# Patient Record
Sex: MALE | Race: BLACK OR AFRICAN AMERICAN | Employment: FULL TIME | URBAN - METROPOLITAN AREA
[De-identification: names, ages, dates, MRNs, and addresses within clinical notes are randomized per-mention and may not be internally consistent; named-entity substitution may affect disease eponyms.]

---

## 2017-02-15 ENCOUNTER — HOSPITAL ENCOUNTER (OUTPATIENT)
Dept: PHYSICAL THERAPY | Age: 52
Discharge: HOME | End: 2017-02-15
Payer: COMMERCIAL

## 2017-02-15 PROCEDURE — 97110 THERAPEUTIC EXERCISES: CPT

## 2017-02-15 PROCEDURE — 97162 PT EVAL MOD COMPLEX 30 MIN: CPT

## 2017-02-15 PROCEDURE — 97530 THERAPEUTIC ACTIVITIES: CPT
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
## 2017-02-21 ENCOUNTER — HOSPITAL ENCOUNTER (OUTPATIENT)
Dept: PHYSICAL THERAPY | Age: 52
Discharge: HOME | End: 2017-02-21
Payer: COMMERCIAL

## 2017-02-21 PROCEDURE — 97113 AQUATIC THERAPY/EXERCISES: CPT

## 2017-02-21 NOTE — PROGRESS NOTES
PHYSICAL THERAPY - DAILY TREATMENT NOTE - AQUATICS    Patient Name: Bridger Catching        Date: 2017  : 1965   YES Patient  Verified  Visit #:   2     Insurance: Payor: Unknown Ades / Plan: Orlando Gilbert PPO / Product Type: PPO /      In time: 2:00 Out time: 2;48   Total Treatment Time: 48     Medicare Time Tracking (below)   Total Timed Codes (min):  na 1:1 Treatment Time:  na     TREATMENT AREA =  C/S, L/S, B shd, B knees     SUBJECTIVE    Pain Level (on 0 to 10 scale):  5  / 10   Medication Changes/New allergies or changes in medical history, any new surgeries or procedures? NO    If yes, update Summary List   Subjective Functional Status/Changes:  []  No changes reported     Pt c/o pain in his neck and lower back today. OBJECTIVE  48  (24) min Therapeutic Exercise:   [x]  Aquatic Therapy   Rationale:      increase strength, increase ROM, and improve balance to improve the patients ability to perform ADL's and ambulate with less pain and increase activity tolerance.       [x]   Patient Education:  Continue Aquatic Therapy and HEP as instructed     UE exercise resistance:                                                  LE exercises:                                                                 [] none                                                                             []  thera-band resistance       [] small water weights                                                   [] no UE support       [x] medium water weights                                              [x]  1 UE support        [] large water weights                                                   [x]  2 UE support          [] back supported on wall       [] thera-band      SLS UE support:                      [] both UE                          [] 1 UE       [] 1 finger/fingers       []  none       [] EC     Post Treatment Pain Level (on 0 to 10) scale:   5  / 10     Other  Introduction to aquatic therapy program.    Pt completed 10-20 reps of each exercise depending on difficulty/tolerance. Pt required cueing to keep B shoulders in water and maintain neutral posture  during UE ex for resistance. ASSESSMENT    Assessment/Changes in Function:     Pt with good tolerance to initiation of aquatic ex. []  See Progress Note/Recertification   Patient will continue to benefit from skilled PT services to modify and progress therapeutic interventions, address functional mobility deficits, address strength deficits, analyze and address soft tissue restrictions, analyze and cue movement patterns, assess and modify postural abnormalities and instruct in home and community integration to attain remaining goals. Progress toward goals / Updated goals:    1) Patient to be independent with aquatherapy to transition to aquatic HEP for self management/prevention after DC. Initiated aquatics this session  2) Patient to increase L cervical AROM rotation to > 70 degrees to facilitate safety in driving. 3) Patient to increase FS FOTO to > 68 to indicate increased functional independence. 4) Patient to be Independent with HEP to self-manage/prevent symptoms after DC.      PLAN    []  Upgrade activities as tolerated YES Continue plan of care   []  Discharge due to :    []  Other:      Therapist: Deana Cheng PTA    Date: 2/21/2017 Time: 6:53 PM     Future Appointments  Date Time Provider Quinton Monroy   2/23/2017 11:00 AM Ning Marsh [facility]   2/24/2017 10:00 AM [address] [place] [address]   2/27/2017 2:00 PM Norma Arredondo PT [facility]   3/1/2017 3:30 PM Ning Marsh PTA [facility]   3/2/2017 2:00 PM Norma Arredondo PT [facility]   3/7/2017 4:30 PM [address] [place] [address]   3/8/2017 3:30 PM [facility]   3/10/2017 11:00 AM [address] [place] [address]   3/14/2017 4:30 PM [address] [place] [address]   3/15/2017 3:30 PM [facility]   3/17/2017 11:00  [facility]

## 2017-02-23 ENCOUNTER — HOSPITAL ENCOUNTER (OUTPATIENT)
Dept: PHYSICAL THERAPY | Age: 52
Discharge: HOME | End: 2017-02-23
Payer: COMMERCIAL

## 2017-02-23 PROCEDURE — 97530 THERAPEUTIC ACTIVITIES: CPT

## 2017-02-23 PROCEDURE — 97110 THERAPEUTIC EXERCISES: CPT

## 2017-02-23 NOTE — PROGRESS NOTES
PHYSICAL THERAPY - DAILY TREATMENT NOTE    Patient Name: Chad Burrell        Date: 2017  : 1965   yes Patient  Verified  Visit #:   3   of   12  Insurance: Payor: Sandee Monae / Plan: [address] [place] [address] [place] PPO / Product Type: PPO /      In time: 1100 Out time: 1155   Total Treatment Time: 55     TREATMENT AREA =  Neck pain [M54.2]  Bilateral shoulder pain [M25.511, M25.512]  Low back pain [M54.5]  Bilateral knee pain [M25.561, M25.562]    SUBJECTIVE  Pain Level (on 0 to 10 scale):  4  / 10   Medication Changes/New allergies or changes in medical history, any new surgeries or procedures?    no  If yes, update Summary List   Subjective Functional Status/Changes:  []  No changes reported     \"I am sore and feeling it in the neck, back today\"          OBJECTIVE  Modalities Rationale:     decrease inflammation, decrease pain and increase tissue extensibility to improve patient's ability to perform ADLs/ bending/stooping/ lifting/prolong sitting, stding and amb/ stairs with ease; carry/lift/reach/perform ADLs with ease     min [] Estim, type/location:                                      []  att     []  unatt     []  w/US     []  w/ice    []  w/heat    min []  Mechanical Traction: type/lbs                   []  pro   []  sup   []  int   []  cont    []  before manual    []  after manual    min []  Ultrasound, settings/location:      min []  Iontophoresis w/ dexamethasone, location:                                               []  take home patch       []  in clinic   10 min [x]  Ice     [x]  Heat    location/position: prone    min []  Vasopneumatic Device, press/temp:     min []  Other:    [x] Skin assessment post-treatment (if applicable):    [x]  intact    []  redness- no adverse reaction     []redness - adverse reaction:        47 min Therapeutic Exercise:  [x]  See flow sheet   Rationale:      increase ROM and increase strength to improve the patients ability to  perform ADLs/ bending/stooping/ lifting/prolong sitting, stding and amb/ stairs with ease; carry/lift/reach/perform ADLs with ease        8 min Therapeutic Activity: postural/bed mobility training   Rationale:    increase ROM and increase strength to improve the patients ability to perform ADLs/ bending/stooping/ lifting/prolong sitting, stding and amb/ stairs with ease; carry/lift/reach/perform ADLs with ease         min Patient Education:  yes  Reviewed HEP   []  Progressed/Changed HEP based on:  Pt ed on importance and benefits of compliance with HEP, core strength/stability and proper posture; pt verbalized understanding         Other Objective/Functional Measures:    VCs + demo to perform proper technique for TE  Initiated TE per flowsheet without c/o p! Reviewed proper bed mobility, lifting techniques and importance and benefits of a neutral spine    rep c/s ret-P/B  c/s ret + R Rot=P/B, abolished p! REIL=abolished p! Post Treatment Pain Level (on 0 to 10) scale:   0  / 10     ASSESSMENT  Assessment/Changes in Function:     Progressed there-ex without c/o increase p! []  See Progress Note/Recertification   Patient will continue to benefit from skilled PT services to modify and progress therapeutic interventions, address functional mobility deficits, address ROM deficits, address strength deficits, analyze and address soft tissue restrictions, analyze and cue movement patterns, analyze and modify body mechanics/ergonomics, assess and modify postural abnormalities and instruct in home and community integration to attain remaining goals.    Progress toward goals / Updated goals:    Pt's first visit since IE, no noted progress        PLAN  []  Upgrade activities as tolerated yes Continue plan of care   []  Discharge due to :    []  Other:      Therapist: Kelsey Ornelas PTA    Date: 2/23/2017 Time: 11:59 AM     Future Appointments  Date Time Provider Quinton Monroy   2/24/2017 10:00 AM [address] [place] [address]   2/27/2017 2:00 PM Jose Mena Juan Dhaliwal, [address] [facility]   3/1/2017 3:30 PM [facility]   3/2/2017 2:00 PM Savanah Evans [facility]   3/7/2017 4:30 PM [address] [place] [address]   3/8/2017 3:30 PM [facility]   3/10/2017 11:00 AM [address] [place] [address]   3/14/2017 4:30 PM [address] [place] [address]   3/15/2017 3:30 PM [facility]   3/17/2017 11:00 AM [address] [place] [address]

## 2017-02-24 ENCOUNTER — HOSPITAL ENCOUNTER (OUTPATIENT)
Dept: PHYSICAL THERAPY | Age: 52
Discharge: HOME | End: 2017-02-24
Payer: COMMERCIAL

## 2017-02-24 PROCEDURE — 97113 AQUATIC THERAPY/EXERCISES: CPT

## 2017-02-24 NOTE — PROGRESS NOTES
PHYSICAL THERAPY - DAILY TREATMENT NOTE - AQUATICS    Patient Name: Praveen Britton        Date: 2017  : 1965   YES Patient  Verified  Visit #:   4     Insurance: Payor: Eboni Comment / Plan: Janay Chowdhury PPO / Product Type: PPO /      In time: 10:00 Out time: 10:45   Total Treatment Time: 45     Medicare Time Tracking (below)   Total Timed Codes (min):  na 1:1 Treatment Time:  na     TREATMENT AREA =  Neck pain [M54.2]  Bilateral shoulder pain [M25.511, M25.512]  Low back pain [M54.5]  Bilateral knee pain [M25.561, M25.562    SUBJECTIVE    Pain Level (on 0 to 10 scale):  4  / 10   Medication Changes/New allergies or changes in medical history, any new surgeries or procedures? NO    If yes, update Summary List   Subjective Functional Status/Changes:  []  No changes reported     Pt reports doing fine after his first pool session last week, no pain or soreness reported. OBJECTIVE  45 min Therapeutic Exercise:   [x]  Aquatic Therapy   Rationale:      increase strength, increase ROM, and improve balance to improve the patients ability to perform ADL's and ambulate with less pain and increase activity tolerance.       [x]   Patient Education:  Continue Aquatic Therapy and HEP as instructed     UE exercise resistance:                                                  LE exercises:                                                                 [] none                                                                             []  thera-band resistance       [] small water weights                                                   [] no UE support       [] medium water weights                                              [x]  1 UE support        [x] large water weights                                                   [x]  2 UE support          [] back supported on wall       [] thera-band      SLS UE support:                      [] both UE                          [] 1 UE       [] 1 finger/fingers       []  none       [] EC     Post Treatment Pain Level (on 0 to 10) scale:   2  / 10     Other  Increased pt to large resistance weights with UE ex. Pt reports increased resistance with ther-ex, but still able to perform ex with proper form and without c/o increased pain/sx's. VCing to maintain neutral posture with retro amb in water. ASSESSMENT    Assessment/Changes in Function:     Pt with good tolerance to ex progression. []  See Progress Note/Recertification   Patient will continue to benefit from skilled PT services to modify and progress therapeutic interventions, address functional mobility deficits, address strength deficits, analyze and address soft tissue restrictions, analyze and cue movement patterns, assess and modify postural abnormalities and instruct in home and community integration to attain remaining goals. Progress toward goals / Updated goals:    1) Patient to be independent with aquatherapy to transition to aquatic HEP for self management/prevention after DC. Mod VCing for form with aquatic ex. 2) Patient to increase L cervical AROM rotation to > 70 degrees to facilitate safety in driving. 3) Patient to increase FS FOTO to > 68 to indicate increased functional independence. 4) Patient to be Independent with HEP to self-manage/prevent symptoms after DC.      PLAN    []  Upgrade activities as tolerated YES Continue plan of care   []  Discharge due to :    []  Other:      Therapist: Ciera Echevarria PTA    Date: 2/24/2017 Time: 2:47 PM     Future Appointments  Date Time Provider Quinton Monroy   2/27/2017 2:00 PM Vipul Thomason [facility]   3/1/2017 3:30 PM [facility]   3/2/2017 2:00 PM Vipul Thomason [facility]   3/6/2017 2:30 PM [facility]   3/7/2017 2:00 PM [address] [place] [address]   3/9/2017 5:00 PM Silvana Torres [facility]   3/13/2017 2:30 PM [address] [place] [address]   3/14/2017 2:00 PM [facility]   3/16/2017 4:30 PM Brian Odonnell PT [facility]

## 2017-02-27 ENCOUNTER — HOSPITAL ENCOUNTER (OUTPATIENT)
Dept: PHYSICAL THERAPY | Age: 52
Discharge: HOME | End: 2017-02-27
Payer: COMMERCIAL

## 2017-02-27 PROCEDURE — 97113 AQUATIC THERAPY/EXERCISES: CPT

## 2017-02-27 NOTE — PROGRESS NOTES
PHYSICAL THERAPY - DAILY TREATMENT NOTE - AQUATICS    Patient Name: Hema Dao        Date: 2017  : 1965   YES Patient  Verified  Visit #:   5     Insurance: Payor: Victoriano Caruso / Plan: Marleny Collier PPO / Product Type: PPO /      In time: 2:00 Out time: 2:46   Total Treatment Time: 46     Medicare Time Tracking (below)   Total Timed Codes (min):  na 1:1 Treatment Time:  na     TREATMENT AREA =  Neck pain [M54.2]  Bilateral shoulder pain [M25.511, M25.512]  Low back pain [M54.5]  Bilateral knee pain [M25.561, M25.562]    SUBJECTIVE    Pain Level (on 0 to 10 scale):  3  / 10   Medication Changes/New allergies or changes in medical history, any new surgeries or procedures? NO    If yes, update Summary List   Subjective Functional Status/Changes:  []  No changes reported     \"It hurts down my L shoulder today. \"          OBJECTIVE  46 min Therapeutic Exercise:   [x]  Aquatic Therapy   Rationale:      increase strength, improve coordination, improve balance and increase proprioception to improve the patients ability to perform ADL\"s and amb with decreased pain and improved ease      [x]   Patient Education:  Continue Aquatic Therapy and HEP as instructed     UE exercise resistance:                                                  LE exercises:                                                                 [] none                                                                             []  thera-band resistance       [] small water weights                                                   [] no UE support       [] medium water weights                                              [x]  1 UE support        [x] X-large water weights                                                   []  2 UE support          [] back supported on wall       [] thera-band      SLS UE support:                      [] both UE                          [] 1 UE       [x] 1 finger/fingers       [x]  none       [] EC Post Treatment Pain Level (on 0 to 10) scale:   3  / 10     Other  min cueing for form with therex  Reports no change in symptoms after session       ASSESSMENT    Assessment/Changes in Function:     Patient is progressing towards goals     []  See Progress Note/Recertification   Patient will continue to benefit from skilled PT services to modify and progress therapeutic interventions, address functional mobility deficits, address ROM deficits, address strength deficits, analyze and address soft tissue restrictions, analyze and cue movement patterns, analyze and modify body mechanics/ergonomics and assess and modify postural abnormalities to attain remaining goals. Progress toward goals / Updated goals:    1) Patient to be independent with aquatherapy to transition to aquatic HEP for self management/prevention after DC. Jim Trejo for form with aquatic ex. 2) Patient to increase L cervical AROM rotation to > 70 degrees to facilitate safety in driving. 3) Patient to increase FS FOTO to > 68 to indicate increased functional independence. 4) Patient to be Independent with HEP to self-manage/prevent symptoms after DC.      PLAN    [x]  Upgrade activities as tolerated YES Continue plan of care   []  Discharge due to :    []  Other:      Therapist: Vimal Villalobos PT    Date: 2/27/2017 Time: 1:48 PM     Future Appointments  Date Time Provider Quinton Monroy   2/27/2017 2:00 PM Vimal Villalobos PT [facility]   3/1/2017 3:30 PM [facility]   3/2/2017 2:00 PM Vimal Villalobos PT [facility]   3/6/2017 2:30 PM [facility]   3/7/2017 2:00 PM [address] [place] [address]   3/9/2017 5:00 PM Gene Dowell PT [facility]   3/13/2017 2:30 PM [address] [place] [address]   3/14/2017 2:00 PM [address] [place] [address]   3/16/2017 4:30 PM Gene Dowell PT [facility]

## 2017-03-01 ENCOUNTER — HOSPITAL ENCOUNTER (OUTPATIENT)
Dept: PHYSICAL THERAPY | Age: 52
Discharge: HOME | End: 2017-03-01
Payer: COMMERCIAL

## 2017-03-01 PROCEDURE — 97110 THERAPEUTIC EXERCISES: CPT

## 2017-03-01 PROCEDURE — 97140 MANUAL THERAPY 1/> REGIONS: CPT

## 2017-03-01 NOTE — PROGRESS NOTES
PHYSICAL THERAPY - DAILY TREATMENT NOTE    Patient Name: Sterling Bae        Date: 3/1/2017  : 1965   yes Patient  Verified  Visit #:     Insurance: Payor: Nikkie Najera / Plan: [address] [place] [id number] Dinah West PPO / Product Type: PPO /      In time: 335 Out time: 435   Total Treatment Time: 60     TREATMENT AREA =  Neck pain [M54.2]  Bilateral shoulder pain [M25.511, M25.512]  Low back pain [M54.5]  Bilateral knee pain [M25.561, M25.562]    SUBJECTIVE  Pain Level (on 0 to 10 scale): 5/ 10   Medication Changes/New allergies or changes in medical history, any new surgeries or procedures?    no  If yes, update Summary List   Subjective Functional Status/Changes:  []  No changes reported     \"Its in the L sh/UT area, I am getting old\"         OBJECTIVE  Modalities Rationale:     decrease inflammation, decrease pain and increase tissue extensibility to improve patient's ability to perform ADLs/ bending/stooping/ lifting/prolong sitting, stding and amb/ stairs with ease; carry/lift/reach/perform ADLs with ease     min [] Estim, type/location:                                      []  att     []  unatt     []  w/US     []  w/ice    []  w/heat    min []  Mechanical Traction: type/lbs                   []  pro   []  sup   []  int   []  cont    []  before manual    []  after manual    min []  Ultrasound, settings/location:      min []  Iontophoresis w/ dexamethasone, location:                                               []  take home patch       []  in clinic   10 min [x]  Ice     [x]  Heat    location/position: prone    min []  Vasopneumatic Device, press/temp:     min []  Other:    [x] Skin assessment post-treatment (if applicable):    [x]  intact    []  redness- no adverse reaction     []redness - adverse reaction:        40 min Therapeutic Exercise:  [x]  See flow sheet   Rationale:      increase ROM and increase strength to improve the patients ability to  perform ADLs/ bending/stooping/ lifting/prolong sitting, stding and amb/ stairs with ease; carry/lift/reach/perform ADLs with ease    10 min Manual Therapy: STM/MFR>TPR to R QL/L/s osorio/lumbosacral junct/piriformis, and OP to L/s for extensions mobs   Rationale:      decrease pain, increase ROM, increase tissue extensibility, decrease trigger points and increase postural awareness to improve patient's ability to perform ADLs/ bending/stooping/ lifting/prolong sitting, stding and amb/ stairs with ease        min Patient Education:  yes  Reviewed HEP   []  Progressed/Changed HEP based on:  Pt ed on importance and benefits of compliance with HEP, core strength/stability and proper posture; pt verbalized understanding         Other Objective/Functional Measures:    VCs + demo to perform proper technique for TE  Initiated Sh flex Alt AROM on 1/2 FR, LTR + piri str,and scap stabs # 4 on 1/2 FR without c/o p! PTT R QL/L/s osorio/lumbosacral junct/piriformis,     Post Treatment Pain Level (on 0 to 10) scale:   \"sore\" 0  / 10     ASSESSMENT  Assessment/Changes in Function:   demos proper bed mobility (I)    Progressed there-ex without c/o increase p! []  See Progress Note/Recertification   Patient will continue to benefit from skilled PT services to modify and progress therapeutic interventions, address functional mobility deficits, address ROM deficits, address strength deficits, analyze and address soft tissue restrictions, analyze and cue movement patterns, analyze and modify body mechanics/ergonomics, assess and modify postural abnormalities and instruct in home and community integration to attain remaining goals. Progress toward goals / Updated goals: · Short Term Goals: To be accomplished in 2-3 weeks:  1) Patient to be adherent to HEP to facilitate pain control with ADL's. MET  2) Patient to be compliant with aquatic program attendance to ensure maximum benefits of PT. MET  3) Patient to tolerate greater than/equal to 30 minutes of aquatic therapy to improve ADL tolerance. MET  4. Patient to Centralize RUE sx to level of C/S to demonstrate effectiveness of directional preference exercises and decrease risk of UE dysfunction     · Long Term Goals: To be accomplished in 4-6 weeks:  1) Patient to be independent with aquatherapy to transition to aquatic HEP for self management/prevention after DC. 2) Patient to increase L cervical AROM rotation to > 70 degrees to facilitate safety in driving. 3) Patient to increase FS FOTO to > 68 to indicate increased functional independence. 4) Patient to be Independent with HEP to self-manage/prevent symptoms after DC.        PLAN  []  Upgrade activities as tolerated yes Continue plan of care   []  Discharge due to :    []  Other:      Therapist: Theresa Sellers PTA    Date: 3/1/2017 Time: 11:59 AM     Future Appointments  Date Time Provider Quinton Monroy   3/2/2017 2:00 PM Shubham Verdugo PT [facility]   3/6/2017 2:30 PM Herlinda Haney [facility]   3/7/2017 2:00 PM [address] [place] [address]   3/9/2017 5:00 PM Brenda Arreola PT [facility]   3/13/2017 2:30 PM [address] [place] [address]   3/14/2017 2:00 PM [address] [place] [address]   3/16/2017 4:30 PM Brenda Arreola PT [facility]

## 2017-03-02 ENCOUNTER — HOSPITAL ENCOUNTER (OUTPATIENT)
Dept: PHYSICAL THERAPY | Age: 52
Discharge: HOME | End: 2017-03-02
Payer: COMMERCIAL

## 2017-03-02 PROCEDURE — 97113 AQUATIC THERAPY/EXERCISES: CPT

## 2017-03-02 NOTE — PROGRESS NOTES
PHYSICAL THERAPY - DAILY TREATMENT NOTE - AQUATICS    Patient Name: Genaro Byrd        Date: 3/2/2017  : 1965   YES Patient  Verified  Visit #:      12  Insurance: Payor: Maty Rios / Plan: [employer] PPO / Product Type: PPO /      In time: 2:00 Out time: 2:40   Total Treatment Time: 40     Medicare Time Tracking (below)   Total Timed Codes (min):  na 1:1 Treatment Time:  na     TREATMENT AREA =  Neck pain [M54.2]  Bilateral shoulder pain [M25.511, M25.512]  Low back pain [M54.5]  Bilateral knee pain [M25.561, M25.562]    SUBJECTIVE    Pain Level (on 0 to 10 scale):  5  / 10   Medication Changes/New allergies or changes in medical history, any new surgeries or procedures? NO    If yes, update Summary List   Subjective Functional Status/Changes:  []  No changes reported     \"I am a 7, I am hurting today. \"          OBJECTIVE  40 min Therapeutic Exercise:   [x]  Aquatic Therapy   Rationale:      increase strength, improve coordination, improve balance and increase proprioception to improve the patients ability to perform ADL's and amb with decreased pain and improved ease      [x]   Patient Education:  Continue Aquatic Therapy and HEP as instructed     UE exercise resistance:                                                  LE exercises:                                                                 [] none                                                                             []  thera-band resistance       [] small water weights                                                   [] no UE support       [] medium water weights                                              [x]  1 UE support        [x] X-large water weights                                                   [x]  2 UE support          [] back supported on wall       [] thera-band      SLS UE support:                      [] both UE                          [] 1 UE       [x] 1 finger/fingers       [x]  none       [] EC     Post Treatment Pain Level (on 0 to 10) scale:   2  / 10     Other  demonstrates good SLS balance in pool with min UE assist  Min cueing for form with ex's       ASSESSMENT    Assessment/Changes in Function:     Patient is progressing well with aquatherapy at this time with decreased pain after session     []  See Progress Note/Recertification   Patient will continue to benefit from skilled PT services to modify and progress therapeutic interventions, address functional mobility deficits, address ROM deficits, address strength deficits, analyze and address soft tissue restrictions, analyze and cue movement patterns, analyze and modify body mechanics/ergonomics and assess and modify postural abnormalities to attain remaining goals. Progress toward goals / Updated goals: · Short Term Goals: To be accomplished in 2-3 weeks:  1) Patient to be adherent to HEP to facilitate pain control with ADL's. MET  2) Patient to be compliant with aquatic program attendance to ensure maximum benefits of PT. MET  3) Patient to tolerate greater than/equal to 30 minutes of aquatic therapy to improve ADL tolerance. MET  4. Patient to Centralize RUE sx to level of C/S to demonstrate effectiveness of directional preference exercises and decrease risk of UE dysfunction      · Long Term Goals: To be accomplished in 4-6 weeks:  1) Patient to be independent with aquatherapy to transition to aquatic HEP for self management/prevention after DC. 2) Patient to increase L cervical AROM rotation to > 70 degrees to facilitate safety in driving. 3) Patient to increase FS FOTO to > 68 to indicate increased functional independence. 4) Patient to be Independent with HEP to self-manage/prevent symptoms after DC.     PN [place]     PLAN    [x]  Upgrade activities as tolerated YES Continue plan of care   []  Discharge due to :    []  Other:      Therapist: Venida Pallas, PT    Date: 3/2/2017 Time: 1:53 PM     Future Appointments  Date Time Provider Department [place]   3/2/2017 2:00 PM Savanah Evans PT [facility]   3/6/2017 2:30 PM Ward Mei [facility]   3/7/2017 2:00 PM [address] [place] [address]   3/9/2017 5:00 PM Argenis Mccabe, PT [facility]   3/13/2017 2:30 PM [address] [place] [address]   3/14/2017 2:00 PM [address] [place] [address]   3/16/2017 4:30 PM Argenis , [facility]

## 2017-03-06 ENCOUNTER — HOSPITAL ENCOUNTER (OUTPATIENT)
Dept: PHYSICAL THERAPY | Age: 52
Discharge: HOME | End: 2017-03-06
Payer: COMMERCIAL

## 2017-03-06 PROCEDURE — 97530 THERAPEUTIC ACTIVITIES: CPT

## 2017-03-06 PROCEDURE — 97110 THERAPEUTIC EXERCISES: CPT

## 2017-03-06 NOTE — PROGRESS NOTES
PHYSICAL THERAPY - DAILY TREATMENT NOTE    Patient Name: Maryann Abreu        Date: 3/6/2017  : 1965   YES Patient  Verified  Visit #:   8   of   12  Insurance: Payor: Valentin Ryaned / Plan: [employer] PPO / Product Type: PPO /      In time: 2:30 Out time: 3:20   Total Treatment Time: 50     Medicare Time Tracking (below)   Total Timed Codes (min):  na 1:1 Treatment Time:  na     TREATMENT AREA =  Neck pain [M54.2]  Bilateral shoulder pain [M25.511, M25.512]  Low back pain [M54.5]  Bilateral knee pain [M25.561, M25.562]    SUBJECTIVE    Pain Level (on 0 to 10 scale):  5/10 L UT   4/10 L/S   Medication Changes/New allergies or changes in medical history, any new surgeries or procedures? NO    If yes, update Summary List   Subjective Functional Status/Changes:  []  No changes reported     Pt c/o numbness in B feet/toes, but states that he was recently diagnosed with diabetes. \"I go to the gym everyday. \"    Pt states that he does crunches at home and that he does the therapy exercises too. Pt reports that his pain \"comes and goes\" and reports 40% improvement in his pain stating it's less often and less intense.         OBJECTIVE    Modalities Rationale:  palliative      min [] Estim, type/location:                                      []  att     []  unatt     []  w/US     []  w/ice    []  w/heat    min []  Mechanical Traction: type/lbs                   []  pro   []  sup   []  int   []  cont    []  before manual    []  after manual    min []  Ultrasound, settings/location:      min []  Iontophoresis w/ dexamethasone, location:                                               []  take home patch       []  in clinic   10 min []  Ice     [x]  Heat    location/position: Supine with LE wedge    min []  Vasopneumatic Device, press/temp:     min []  Other:    [x] Skin assessment post-treatment (if applicable):    [x]  intact    []  redness- no adverse reaction     []redness - adverse reaction:      32 min Therapeutic Exercise:  [x]  See flow sheet   Rationale:    Increase core strength/stabilization, ROM/flexibility to improve pt's ability to perform ADL's with increased ease and less pain to promote increased activity tolerance. 8 min Therapeutic Activity: FOTO, C/S AROM rot   Rationale:   functional assessment     min Patient Education:  YES  Reviewed HEP   []  Progressed/Changed HEP based on:   Pt advised to f/u with MD regarding foot numbness/tingling in case of nerve pain from diabetes. Pt verbalized understanding. Other Objective/Functional Measures:    FOTO 58    C/S AROM rotation:  R 70 deg  L 55 deg with pain    C/S retraction centralizes sx's to [place] for posture and to facilitate scapular retraction with tband row/ext. VCing and demonstration for proper form with C/S retractions in sitting/standing. Post Treatment Pain Level (on 0 to 10) scale:   2  / 10     ASSESSMENT    Assessment/Changes in Function:     Pt's FOTO score improved 12 points since IE indicating increased activity tolerance and correlating to a 42% functional limitation. [x]  See Progress Note/Recertification   Patient will continue to benefit from skilled PT services to modify and progress therapeutic interventions, address functional mobility deficits, address ROM deficits, address strength deficits, analyze and address soft tissue restrictions, analyze and cue movement patterns, analyze and modify body mechanics/ergonomics, assess and modify postural abnormalities and instruct in home and community integration to attain remaining goals.      Progress toward goals / Updated goals:    See PN     PLAN    []  Upgrade activities as tolerated YES Continue plan of care   []  Discharge due to :    []  Other:      Therapist: Latha Sarkar PTA    Date: 3/6/2017 Time: 3:50 PM     Future Appointments  Date Time Provider Quinton Monroy   3/7/2017 2:00 PM [address] [place] [address]   3/9/2017 5:00 PM Bernardo Covert Mustapha [facility]head [facility]   3/13/2017 2:30  [facility]   3/14/2017 2:00  [facility]   3/16/2017 4:30 PM Yajaira Hollis PT [facility]

## 2017-03-07 ENCOUNTER — HOSPITAL ENCOUNTER (OUTPATIENT)
Dept: PHYSICAL THERAPY | Age: 52
Discharge: HOME | End: 2017-03-07
Payer: COMMERCIAL

## 2017-03-07 ENCOUNTER — APPOINTMENT (OUTPATIENT)
Dept: PHYSICAL THERAPY | Age: 52
End: 2017-03-07
Payer: COMMERCIAL

## 2017-03-07 PROCEDURE — 97113 AQUATIC THERAPY/EXERCISES: CPT

## 2017-03-07 NOTE — PROGRESS NOTES
PHYSICAL THERAPY - DAILY TREATMENT NOTE - AQUATICS    Patient Name: Gianni Anna        Date: 3/7/2017  : 1965   YES Patient  Verified  Visit #:      12  Insurance: Payor: Morene Rash / Plan: [employer] PPO / Product Type: PPO /      In time: 2:00 Out time: 2:48   Total Treatment Time: 48     Medicare Time Tracking (below)   Total Timed Codes (min):  na 1:1 Treatment Time:  na     TREATMENT AREA =  Neck pain [M54.2]  Bilateral shoulder pain [M25.511, M25.512]  Low back pain [M54.5]  Bilateral knee pain [M25.561, M25.562]       SUBJECTIVE    Pain Level (on 0 to 10 scale):  6  / 10   Medication Changes/New allergies or changes in medical history, any new surgeries or procedures? NO    If yes, update Summary List   Subjective Functional Status/Changes:  []  No changes reported     Pt c/o pain in L side of neck (UT) and R side of lower back. OBJECTIVE  48  (24) min Therapeutic Exercise:   [x]  Aquatic Therapy   Rationale:      increase strength, increase ROM, and improve balance to improve the patients ability to perform ADL's and ambulate with less pain and increase activity tolerance.       [x]   Patient Education:  Continue Aquatic Therapy and HEP as instructed     UE exercise resistance:                                                  LE exercises:                                                                 [] none                                                                             []  thera-band resistance       [] small water weights                                                   [] no UE support       [] medium water weights                                              [x]  1 UE support        [x] large water weights                                                   [x]  2 UE support          [] back supported on wall       [] thera-band      SLS UE support:                      [] both UE                          [] 1 UE       [] 1 finger/fingers       []  none [] EC     Post Treatment Pain Level (on 0 to 10) scale:   4  / 10     Other  Pt reports R sided LBP pain resolves while in water. Pt able to perform RICK in water with good form. Pt requires min VCing for form with LE ex and min to mod VCing for form with UE ex. ASSESSMENT    Assessment/Changes in Function:     Pt continues with c/o intermittent C/S and L/S radicular sx's with ADL's.      []  See Progress Note/Recertification   Patient will continue to benefit from skilled PT services to modify and progress therapeutic interventions, address functional mobility deficits, address ROM deficits, address strength deficits, analyze and address soft tissue restrictions, analyze and cue movement patterns, analyze and modify body mechanics/ergonomics and assess and modify postural abnormalities to attain remaining goals. Progress toward goals / Updated goals:    1) Patient to be independent with aquatherapy to transition to aquatic HEP for self management/prevention after DC. Pt progressing toward I with aquatic ex  2) Patient to increase L cervical AROM rotation to > 70 degrees to facilitate safety in driving. 3) Patient to increase FS FOTO to > 68 to indicate increased functional independence. 4) Patient to be Independent with HEP to self-manage/prevent symptoms after DC.      PLAN    []  Upgrade activities as tolerated YES Continue plan of care   []  Discharge due to :    []  Other:      Therapist: Devin Nascimento PTA    Date: 3/7/2017 Time: 4:26 PM     Future Appointments  Date Time Provider Quinton Monroy   3/9/2017 5:00 PM Julita Weaver, [address] [place] [address]   3/13/2017 2:30 PM [address] [place] [address]   3/14/2017 2:00 PM [address] [place] [address]   3/16/2017 4:30 PM Julita Weaver, PT [facility]

## 2017-03-07 NOTE — PROGRESS NOTES
[facility] PHYSICAL THERAPY  [address] [place] [address] [address], [place], [address] - Phone: [phone]  Fax: [phone]  PROGRESS NOTE  Patient Name: Charity See : 1965   Treatment/Medical Diagnosis: Neck pain [M54.2]  Bilateral shoulder pain [M25.511, M25.512]  Low back pain [M54.5]  Bilateral knee pain [M25.561, M25.562]   Referral Source: Jelani Nino MD     Date of Initial Visit: 2/15/17 Attended Visits: 8 Missed Visits: 0     SUMMARY OF TREATMENT  Pt's rx consist of an active warm up on UBE, core/L/s//LE/c/s/scap/core/UE strengthening/stability/stretching TE, repeated c/s movement (MDT/Keanu), manual therapy (STM/MFR>TPR to R QL/L/s osorio/lumbosacral junct/piriformis, and OP to L/s for extensions mobs),  MHP post rx, and instruction on HEP + proper body mechanics  CURRENT STATUS  Pt progressing well as evidence by the following. Pt compliant with HEP, and is able to demo TE in gym properly without increase p!. Pt able to manage c/s and L/s sxs with repeated movements (MDT). Pt's c/s Rot increased significantly to 55 degs (IE=35 degs AROM). Pt with reports of intermittent numbness in  in B feet/toes; however, 40% improvement in his sxs since [facility]. Functional status summery=58 (IE=46/goal=68);  indicating improved functional mobility. Goal/Measure of Progress Goal Met? 1. Patient to be independent with aquatherapy to transition to aquatic HEP for self management/prevention after DC. Status at last Eval: initiate aquatics Current Status: compliance with HEP progressing   2. Patient to increase L cervical AROM rotation to > 70 degrees to facilitate safety in driving. Status at last Eval: 35 Current Status: R 70 deg  L 55 deg progressing   3. Patient to increase FS FOTO to > 68 to indicate increased functional independence. Status at last Eval: 46 Current Status: 58 progressing   4.    Patient to be Independent with HEP to self-manage/prevent symptoms after DC   Status at last Eval: establish HEP Current Status: compliance with HEP progressing     New Goals to be achieved in __4__  weeks:  ) Patient to be independent with aquatherapy to transition to aquatic HEP for self management/prevention after DC. 2) Patient to increase L cervical AROM rotation to > 70 degrees to facilitate safety in driving. 3) Patient to increase FS FOTO to > 68 to indicate increased functional independence. 4) Patient to be Independent with HEP to self-manage/prevent symptoms after DC. RECOMMENDATIONS  Pt will benefit from further skilled PT services 2-3x wk x 4 wks to increase strength/stability and decrease sxs to promote functional mobility. If you have any questions/comments please contact us directly at [phone]. Thank you for allowing us to assist in the care of your patient. LPTA Signature: Janneth Arboleda PTA  Date: 3/7/2017   PT Signature: Ovidio Biggs DPT Time: 8:14 AM   NOTE TO PHYSICIAN:  PLEASE COMPLETE THE ORDERS BELOW AND FAX TO   [facility] [place] Physical Therapy: [phone]. If you are unable to process this request in 24 hours please contact our office: [phone].    ___ I have read the above report and request that my patient continue as recommended.   ___ I have read the above report and request that my patient continue therapy with the following changes/special instructions:_________________________________________________________   ___ I have read the above report and request that my patient be discharged from therapy.      Physician Signature:        Date:       Time:

## 2017-03-08 ENCOUNTER — APPOINTMENT (OUTPATIENT)
Dept: PHYSICAL THERAPY | Age: 52
End: 2017-03-08
Payer: COMMERCIAL

## 2017-03-10 ENCOUNTER — APPOINTMENT (OUTPATIENT)
Dept: PHYSICAL THERAPY | Age: 52
End: 2017-03-10
Payer: COMMERCIAL

## 2017-03-13 ENCOUNTER — HOSPITAL ENCOUNTER (OUTPATIENT)
Dept: PHYSICAL THERAPY | Age: 52
Discharge: HOME | End: 2017-03-13
Payer: COMMERCIAL

## 2017-03-13 PROCEDURE — 97110 THERAPEUTIC EXERCISES: CPT

## 2017-03-13 PROCEDURE — 97530 THERAPEUTIC ACTIVITIES: CPT

## 2017-03-13 NOTE — PROGRESS NOTES
PHYSICAL THERAPY - DAILY TREATMENT NOTE    Patient Name: Praveen Britton        Date: 3/13/2017  : 1965   YES Patient  Verified  Visit #:   10   of   12  Insurance: Payor: Eboni Comment / Plan: [employer] PPO / Product Type: PPO /      In time: 2:10 Out time: 1:48   Total Treatment Time: 38     Medicare Time Tracking (below)   Total Timed Codes (min):  na 1:1 Treatment Time:  na     TREATMENT AREA =  Neck pain [M54.2]  Bilateral shoulder pain [M25.511, M25.512]  Low back pain [M54.5]  Bilateral knee pain [M25.561, M25.562]     SUBJECTIVE    Pain Level (on 0 to 10 scale):  2  / 10   Medication Changes/New allergies or changes in medical history, any new surgeries or procedures? NO    If yes, update Summary List   Subjective Functional Status/Changes:  []  No changes reported     Pt states that he will sometimes get the leg pain if he sits or stands for too long. He had to drive to [facility] the other day and his back and R leg were hurting from that. Pt reports only doing some of his exercises at home and states \"You know what I do at home, I sleep. \" pt does report doing his neck and back extensions as needed to manage his neck and leg sx's. Pt reports that he does the bike at the gym for cardio and then does weights on the machines. OBJECTIVE    28 min Therapeutic Exercise:  [x]  See flow sheet   Rationale:    Increase core strength/stabilization, ROM/flexibility to improve pt's ability to perform ADL's with increased ease and less pain to promote increased activity tolerance. 10 min Therapeutic Activity: Postural awarness with prolonged standing and sitting. Neutral L/S with working activity, bending and lifting   Rationale:  body mechanics and postural awareness      min Patient Education:  Henry Cerda   []  Progressed/Changed HEP based on:         Other Objective/Functional Measures:    Discussed at length pt's body mechanics with working activities and discussed possible options for pt to trial at work to promote neutral posture. Discussed pt adding 2-3 core stabilization exercises into his regular work out routine. Pt agreeable. Rodolfo Rodney for posture and TA recruitment with ther-ex. Post Treatment Pain Level (on 0 to 10) scale:   2  / 10     ASSESSMENT    Assessment/Changes in Function:     Pt tolerating ther-ex without c/o; however, pt continues with c/o intermittent R sided LBP and L sided C/S pain with ADL's.      []  See Progress Note/Recertification   Patient will continue to benefit from skilled PT services to modify and progress therapeutic interventions, address functional mobility deficits, address ROM deficits, address strength deficits, analyze and address soft tissue restrictions, analyze and cue movement patterns, analyze and modify body mechanics/ergonomics and assess and modify postural abnormalities to attain remaining goals. .     Progress toward goals / Updated goals:    1) Patient to be independent with aquatherapy to transition to aquatic HEP for self management/prevention after DC. Pt progressing toward I with aquatic ex  2) Patient to increase L cervical AROM rotation to > 70 degrees to facilitate safety in driving. 3) Patient to increase FS FOTO to > 68 to indicate increased functional independence. 4) Patient to be Independent with HEP to self-manage/prevent symptoms after DC.  Pt is progressing toward I with land HEP     PLAN    []  Upgrade activities as tolerated YES Continue plan of care   []  Discharge due to :    []  Other:      Therapist: Danette Real PTA    Date: 3/13/2017 Time: 2:30 PM     Future Appointments  Date Time Provider Quinton Monroy   3/14/2017 2:00 PM [address] [place] [address]   3/16/2017 4:30 PM Sushila Lu PT [facility]

## 2017-03-14 ENCOUNTER — APPOINTMENT (OUTPATIENT)
Dept: PHYSICAL THERAPY | Age: 52
End: 2017-03-14
Payer: COMMERCIAL

## 2017-03-14 ENCOUNTER — HOSPITAL ENCOUNTER (OUTPATIENT)
Dept: PHYSICAL THERAPY | Age: 52
Discharge: HOME | End: 2017-03-14
Payer: COMMERCIAL

## 2017-03-14 PROCEDURE — 97113 AQUATIC THERAPY/EXERCISES: CPT

## 2017-03-14 NOTE — PROGRESS NOTES
PHYSICAL THERAPY - DAILY TREATMENT NOTE - AQUATICS    Patient Name: Sapphire Duffy        Date: 3/14/2017  : 1965   YES Patient  Verified  Visit #:     Insurance: Payor: Denis Orlando / Plan: [employer] PPO / Product Type: PPO /      In time: 2:00 Out time: 2:45   Total Treatment Time: 45     Medicare Time Tracking (below)   Total Timed Codes (min):  na 1:1 Treatment Time:  na     TREATMENT AREA =  Neck pain [M54.2]  Bilateral shoulder pain [M25.511, M25.512]  Low back pain [M54.5]  Bilateral knee pain [M25.561, M25.562]    SUBJECTIVE    Pain Level (on 0 to 10 scale):  0  / 10   Medication Changes/New allergies or changes in medical history, any new surgeries or procedures? NO    If yes, update Summary List   Subjective Functional Status/Changes:  []  No changes reported     Pt reports no pain, but feels \"tight\" in his neck and back ms. OBJECTIVE  45 min Therapeutic Exercise:   [x]  Aquatic Therapy   Rationale:      increase strength, increase ROM, and improve balance to improve the patients ability to perform ADL's and ambulate with less pain and increase activity tolerance.       [x]   Patient Education:  Continue Aquatic Therapy and HEP as instructed     UE exercise resistance:                                                  LE exercises:                                                                 [] none                                                                             []  thera-band resistance       [] small water weights                                                   [] no UE support       [] medium water weights                                              [x]  1 UE support        [x] large water weights                                                   [x]  2 UE support          [] back supported on wall       [] thera-band      SLS UE support:                      [] both UE                          [] 1 UE       [] 1 finger/fingers       []  none       [] EC Post Treatment Pain Level (on 0 to 10) scale:   0  / 10     Other Min VCing for form and posture with ther-ex. Pt without c/o during aquatic session and no pain reported at end of session. ASSESSMENT    Assessment/Changes in Function:     Pt reporting decreased pain level today and able to perform aquatic ther-ex without increased pain/sx's.      []  See Progress Note/Recertification   Patient will continue to benefit from skilled PT services to modify and progress therapeutic interventions, address functional mobility deficits, address ROM deficits, address strength deficits, analyze and address soft tissue restrictions, analyze and cue movement patterns, analyze and modify body mechanics/ergonomics and assess and modify postural abnormalities to attain remaining goals. Progress toward goals / Updated goals:    1) Patient to be independent with aquatherapy to transition to aquatic HEP for self management/prevention after DC. Pt progressing toward I with aquatic ex  2) Patient to increase L cervical AROM rotation to > 70 degrees to facilitate safety in driving. 3) Patient to increase FS FOTO to > 68 to indicate increased functional independence. 4) Patient to be Independent with HEP to self-manage/prevent symptoms after DC.  Pt is progressing toward I with land HEP     PLAN    []  Upgrade activities as tolerated YES Continue plan of care   []  Discharge due to :    []  Other:      Therapist: Eva Ye PTA    Date: 3/14/2017 Time: 3:45 PM     Future Appointments  Date Time Provider Quinton Monroy   3/16/2017 4:30 PM Fredrick Israel PT [facility]

## 2017-03-15 ENCOUNTER — APPOINTMENT (OUTPATIENT)
Dept: PHYSICAL THERAPY | Age: 52
End: 2017-03-15
Payer: COMMERCIAL

## 2017-03-16 ENCOUNTER — HOSPITAL ENCOUNTER (OUTPATIENT)
Dept: PHYSICAL THERAPY | Age: 52
End: 2017-03-16
Payer: COMMERCIAL

## 2017-03-17 ENCOUNTER — APPOINTMENT (OUTPATIENT)
Dept: PHYSICAL THERAPY | Age: 52
End: 2017-03-17
Payer: COMMERCIAL

## 2017-03-22 ENCOUNTER — HOSPITAL ENCOUNTER (OUTPATIENT)
Dept: PHYSICAL THERAPY | Age: 52
Discharge: HOME | End: 2017-03-22
Payer: COMMERCIAL

## 2017-03-22 PROCEDURE — 97110 THERAPEUTIC EXERCISES: CPT

## 2017-03-22 NOTE — PROGRESS NOTES
PHYSICAL THERAPY - DAILY TREATMENT NOTE    Patient Name: Sawyer Rodriguez        Date: 3/22/2017  : 1965   yes Patient  Verified  Visit #:     Insurance: Payor: Joel Byrne / Plan: [facility] PPO / Product Type: PPO /      In time: 425 Out time: 505   Total Treatment Time: 40     TREATMENT AREA =  Neck pain [M54.2]  Bilateral shoulder pain [M25.511, M25.512]  Low back pain [M54.5]  Bilateral knee pain [M25.561, M25.562]    SUBJECTIVE  Pain Level (on 0 to 10 scale): 0/ 10   Medication Changes/New allergies or changes in medical history, any new surgeries or procedures?    no  If yes, update Summary List   Subjective Functional Status/Changes:  []  No changes reported     \"I will do this, but I do go to gym.  I like the pool too\"         OBJECTIVE  Modalities Rationale:     decrease inflammation, decrease pain and increase tissue extensibility to improve patient's ability to perform ADLs/ bending/stooping/ lifting/prolong sitting, stding and amb/ stairs with ease; carry/lift/reach/perform ADLs with ease     min [] Estim, type/location:                                      []  att     []  unatt     []  w/US     []  w/ice    []  w/heat    min []  Mechanical Traction: type/lbs                   []  pro   []  sup   []  int   []  cont    []  before manual    []  after manual    min []  Ultrasound, settings/location:      min []  Iontophoresis w/ dexamethasone, location:                                               []  take home patch       []  in clinic   10 min []  Ice     [x]  Heat    location/position: prone    min []  Vasopneumatic Device, press/temp:     min []  Other:    [x] Skin assessment post-treatment (if applicable):    [x]  intact    []  redness- no adverse reaction     []redness - adverse reaction:        30 min Therapeutic Exercise:  [x]  See flow sheet   Rationale:      increase ROM and increase strength to improve the patients ability to  perform ADLs/ bending/stooping/ lifting/prolong sitting, stding and amb/ stairs with ease; carry/lift/reach/perform ADLs with ease       min Patient Education:  yes  Reviewed HEP   -  Progressed/Changed HEP based on:  Pt ed on importance and benefits of compliance with HEP, core strength/stability and proper posture; pt verbalized understanding  See updated HEP in chart       Other Objective/Functional Measures:    VCs + demo to perform proper technique for TE  Initiate tband with hip 3 way without c/o p! L c/s ROT AROM=76 degs  FOTO=58   Post Treatment Pain Level (on 0 to 10) scale:  0  / 10     ASSESSMENT  Assessment/Changes in Function:   Progressed there-ex without c/o increase p! []  See Progress Note/Recertification   Patient will continue to benefit from skilled PT services to modify and progress therapeutic interventions, address functional mobility deficits, address ROM deficits, address strength deficits, analyze and address soft tissue restrictions, analyze and cue movement patterns, analyze and modify body mechanics/ergonomics, assess and modify postural abnormalities and instruct in home and community integration to attain remaining goals. Progress toward goals / Updated goals:       · Long Term Goals: To be accomplished in 4-6 weeks:  1) Patient to be independent with aquatherapy to transition to aquatic HEP for self management/prevention after DC. MET  2) Patient to increase L cervical AROM rotation to > 70 degrees to facilitate safety in driving. MET; L c/s ROT AROM=76 degs  3) Patient to increase FS FOTO to > 68 to indicate increased functional independence. progressing; 58  4) Patient to be Independent with HEP to self-manage/prevent symptoms after DC.   MET       PLAN  []  Upgrade activities as tolerated no Continue plan of care   []  Discharge due to :    []  Other: d/c with HEP     Therapist: Steven Khan PTA    Date: 3/22/2017 Time: 4:26 AM     Future Appointments  Date Time Provider Quinton Monroy   3/22/2017 4:30 PM Yumiko Ernst Abhi [facility]

## 2017-03-22 NOTE — PROGRESS NOTES
[facility] PHYSICAL THERAPY  [address] [place] [address] [address], [place], [address] - Phone: [phone]  Fax: [phone] NOTE  Patient Name: Nicolle RODRIGUEZ : 1965   Treatment/Medical Diagnosis: Neck pain [M54.2]  Bilateral shoulder pain [M25.511, M25.512]  Low back pain [M54.5]  Bilateral knee pain [M25.561, M25.562]   Referral Source: Tavo Richardson MD     Date of Initial Visit: 2/15/17 Attended Visits: 12 Missed Visits: 0     SUMMARY OF TREATMENT  Pt's rx consist of an active warm up on UBE, core/L/s//LE/c/s/scap/core/UE strengthening/stability/stretching TE, repeated c/s movement (LYN/Keanu),  MHP post rx, and instruction on HEP + proper body mechanics  CURRENT STATUS  Pt as progressed well in PT as evidence with HEP. Pt reports compliance + [place] with HEP and aquatic program. Pt (I) with managing sxs and abolishing with Rep L/s movements (MDT). Pt increased c/s AROM L Rot from 55 to 76 degs; improving ability to drive safely and perform work tasks. Pt is able to demo TE in gym properly without increase p!. Pt reports that he plans to join local gym/pool to continue TE and maintain strength/stability. Goal/Measure of Progress Goal Met? 1. Patient to be independent with aquatherapy to transition to aquatic HEP for self management/prevention after DC   Status at last Eval: complaint with HEP Current Status:  Pt reports compliance + [place] with HEP yes   2. Patient to increase L cervical AROM rotation to > 70 degrees to facilitate safety in driving. Status at last Eval: R 70 deg  L 55 deg Current Status: 76 degs AROM yes   3. Patient to increase FS FOTO to > 68 to indicate increased functional independence. Status at last Eval: 58 Current Status: 58 progressing     4. Patient to be Independent with HEP to self-manage/prevent symptoms after DC.    Status at last Eval: complaint with HEP Current Status:  Pt reports compliance + [place] with HEP yes     RECOMMENDATIONS  Pt D/C with instructions to cont HEP Independently. If you have any questions/comments please contact us directly at [phone]. Thank you for allowing us to assist in the care of your patient. LPTA Signature: Rosa Isela Dela Cruz PTA  Date: 3/22/2017   PT Signature: Shu Skelton DPT Time: 4:57 PM   NOTE TO PHYSICIAN:  PLEASE COMPLETE THE ORDERS BELOW AND FAX TO   [facility] Physical Therapy: [phone]. If you are unable to process this request in 24 hours please contact our office: [phone].    ___ I have read the above report and request that my patient continue as recommended.   ___ I have read the above report and request that my patient continue therapy with the following changes/special instructions:_________________________________________________________   ___ I have read the above report and request that my patient be discharged from therapy.      Physician Signature:        Date:       Time:

## 2018-03-20 PROBLEM — T14.8XXA: Status: ACTIVE | Noted: 2018-03-20
